# Patient Record
Sex: MALE | Race: WHITE | Employment: FULL TIME | URBAN - NONMETROPOLITAN AREA
[De-identification: names, ages, dates, MRNs, and addresses within clinical notes are randomized per-mention and may not be internally consistent; named-entity substitution may affect disease eponyms.]

---

## 2020-05-29 ENCOUNTER — HOSPITAL ENCOUNTER (EMERGENCY)
Age: 55
Discharge: HOME | End: 2020-05-29
Attending: EMERGENCY MEDICINE
Payer: COMMERCIAL

## 2020-05-29 VITALS
OXYGEN SATURATION: 98 % | BODY MASS INDEX: 33.07 KG/M2 | DIASTOLIC BLOOD PRESSURE: 93 MMHG | HEIGHT: 67 IN | HEART RATE: 85 BPM | TEMPERATURE: 97.7 F | RESPIRATION RATE: 18 BRPM | WEIGHT: 210.7 LBS | SYSTOLIC BLOOD PRESSURE: 173 MMHG

## 2020-05-29 PROCEDURE — 99282 EMERGENCY DEPT VISIT SF MDM: CPT

## 2020-05-29 RX ORDER — AMLODIPINE BESYLATE 10 MG/1
10 TABLET ORAL DAILY
COMMUNITY
Start: 2020-04-23

## 2020-05-29 RX ORDER — MECLIZINE HYDROCHLORIDE 25 MG/1
25 TABLET ORAL 3 TIMES DAILY PRN
COMMUNITY
Start: 2020-05-22

## 2020-05-29 RX ORDER — CEFUROXIME AXETIL 500 MG/1
500 TABLET ORAL EVERY 12 HOURS
COMMUNITY
Start: 2020-05-22 | End: 2020-06-01

## 2020-05-29 RX ORDER — LISINOPRIL 40 MG/1
40 TABLET ORAL DAILY
COMMUNITY
Start: 2020-04-23

## 2020-05-29 RX ORDER — ATORVASTATIN CALCIUM 20 MG/1
20 TABLET, FILM COATED ORAL DAILY
COMMUNITY
Start: 2020-04-23

## 2020-05-29 RX ORDER — TRAZODONE HYDROCHLORIDE 50 MG/1
50 TABLET ORAL
COMMUNITY
Start: 2017-08-01

## 2020-05-29 RX ORDER — LEVOTHYROXINE SODIUM 0.05 MG/1
50 TABLET ORAL DAILY
COMMUNITY
Start: 2019-12-05

## 2020-05-29 RX ORDER — OMEPRAZOLE 40 MG/1
40 CAPSULE, DELAYED RELEASE ORAL DAILY
COMMUNITY
Start: 2020-04-23

## 2020-05-29 ASSESSMENT — ENCOUNTER SYMPTOMS
DIARRHEA: 0
ABDOMINAL PAIN: 0
SHORTNESS OF BREATH: 0
VOMITING: 0
BACK PAIN: 0
PHOTOPHOBIA: 0
WHEEZING: 0
COUGH: 0
NAUSEA: 0
CHEST TIGHTNESS: 0

## 2020-05-29 NOTE — ED PROVIDER NOTES
visual disturbance. Respiratory: Negative for cough, chest tightness, shortness of breath and wheezing. Cardiovascular: Negative for chest pain, palpitations and leg swelling. Gastrointestinal: Negative for abdominal pain, diarrhea, nausea and vomiting. Genitourinary: Negative for dysuria, flank pain and frequency. Musculoskeletal: Negative for back pain, myalgias and neck pain. Skin: Negative for pallor, rash and wound. Neurological: Positive for dizziness. Negative for syncope, light-headedness, numbness and headaches. All other systems reviewed and are negative. Except as noted above the remainder of the review of systems was reviewed and is negative. PHYSICAL EXAM    (up to 7 for level 4, 8 or more for level 5)     ED Triage Vitals   BP Temp Temp src Pulse Resp SpO2 Height Weight   -- -- -- -- -- -- -- --       Physical Exam  Vitals signs and nursing note reviewed. Constitutional:       General: He is not in acute distress. Appearance: Normal appearance. He is well-developed. He is not ill-appearing, toxic-appearing or diaphoretic. HENT:      Head: Normocephalic and atraumatic. Right Ear: Tympanic membrane, ear canal and external ear normal. There is no impacted cerumen. Left Ear: Tympanic membrane, ear canal and external ear normal. There is no impacted cerumen. Eyes:      General: No visual field deficit or scleral icterus. Right eye: No discharge. Left eye: No discharge. Extraocular Movements: Extraocular movements intact. Conjunctiva/sclera: Conjunctivae normal.      Pupils: Pupils are equal, round, and reactive to light. Neck:      Musculoskeletal: Normal range of motion and neck supple. No neck rigidity, spinous process tenderness or muscular tenderness. Trachea: Trachea normal.   Cardiovascular:      Rate and Rhythm: Normal rate and regular rhythm. Heart sounds: Normal heart sounds.    Pulmonary:      Effort: Pulmonary effort Patient presented for vertigo. His symptoms are actually improving and he is just here for a work note. He already has a prescription for Antivert and states he will continue to take that as needed along with completing his antibiotic. Unremarkable exam here in the emergency department. NIH of 0. No focal findings. HEENT exam also unremarkable. It could be that his vertigo started with an otitis media as he was started on an antibiotic for an acute otitis media at the same times of vertigo diagnosis. It appears that the left ear pain started around the same time as the vertigo. I feel he is stable for outpatient follow-up with his family physician. Symptoms likely peripheral. I do not feel emergent imaging is indicated at this time. We will also provide him with a referral to ENT. He was given signs and symptoms of when to return to the emergency department. Patient discharged home in stable condition. Strict return precautions and follow up instructions were discussed with the patient with which the patient agrees    ED Medications administered this visit:  Medications - No data to display    CONSULTS: (None if blank)  None    Procedures: (None if blank)       CLINICAL IMPRESSION      1.  Vertigo          DISPOSITION/PLAN    DISPOSITION        PATIENT REFERRED TO:  your physician    Schedule an appointment as soon as possible for a visit in 3 days  If symptoms worsen return to [address] [place] [address] 20, [address] [facility] [phone]    Schedule an appointment as soon as possible for a visit in 1 week  If symptoms worsen return to ER      DISCHARGE MEDICATIONS:  New Prescriptions    No medications on file              (Please note that portions of this note were completed with a voicerecognition program.  Efforts were made to edit the dictations but occasionally words are mis-transcribed.)      Nicola Nolen DO (electronically signed)  Attending Emergency Department Provider